# Patient Record
(demographics unavailable — no encounter records)

---

## 2018-11-05 NOTE — RAD
LEFT MIDDLE FINGER INTRAOPERATIVE FLUOROSCOPIC IMAGES:

 

COMPARISON:

Views of the left hand done earlier today, 

 

TECHNIQUE:  

Three intraoperative fluoroscopic images of the left middle finger are submitted for interpretation. 
 Fluoroscopic guidance was provided for Dr. Solomon.

 

FINDINGS/IMPRESSION:

Intraoperative fluoroscopic images demonstrate two K-wires transfixing the distal interphalangeal valentine
nt of the left middle finger.  There is suggestion of a tiny avulsion fracture at the medial aspect b
ase of the distal phalanx.  Correlation with intraoperative findings is recommended.

 

POS: [facility]

## 2018-11-05 NOTE — RAD
THREE VIEWS LEFT HAND:

 

COMPARISON: 

None.

 

HISTORY:  

Cut finger on a rope 1 hour prior 1 hour prior when attempting to catch a horse.

 

FINDINGS: 

There is a mallet deformity of the middle finger at the distal phalanx.  No fracture is seen.  There 
is a radiopaque foreign body adjacent to the index finger near the metacarpophalangeal joint.  

 

IMPRESSION: 

Mallet deformity of the middle finger may be acute or chronic.  This could represent a rupture of the
 common extensor tendon if acute.

 

POS: [facility]

## 2018-11-06 NOTE — OP
DATE OF PROCEDURE:  11/05/2018

 

PREOPERATIVE DIAGNOSES:

1.  Left middle finger open dislocation.

2.  Left middle finger open extensor tendon laceration terminal nearly partial avulsion.

3.  Left middle finger radial collateral ligament tear.

4.  Left middle finger digital nerve laceration, radial aspect 2 of 3 rami, terminal, and 3.0 cm woun
d.

 

POSTOPERATIVE DIAGNOSES:

1.  Left middle finger open dislocation.

2.  Left middle finger open extensor tendon laceration terminal nearly partial avulsion.

3.  Left middle finger radial collateral ligament tear.

4.  Left middle finger digital nerve laceration, radial aspect 2 of 3 rami, terminal, and 3.0 cm woun
d.  Intact circulation.

 

PROCEDURES PERFORMED:

1.  Wound debridement.

2.  Debridement of material associated open dislocation with techniques as follows for both the two d
ebridements.

A.  Excisional technique.

B.  Use of curette, tenotomy scissors, Crile hemostat, Blackfeet blade, Pulsavac.

3.  Mild contamination found in the caudal articular, moderate contamination found in the subcutaneou
s tissue, requiring more extensive debridement and the excisional technique.

4.  Microscopic radial digital nerve repair, 9-0 Nurolon suture.

5.  Open reduction and internal fixation of open middle finger distal interphalangeal joint dislocati
on.

6.  Open extensor tendon repair.

7.  Open radial collateral ligament repair.

8.  C-arm supervision including debridement of material associated open dislocation distal phalangeal
 joint.

 

TOURNIQUET TIME:  66 minutes.

 

BLOOD LOSS:  50 mL.

 

FINDINGS:  Moderate soft tissue contamination, negative chondral surface damage.

 

INDICATION:  The patient with a rope type rotational injury leaving him with open dislocation, dorsal
 wound more than palmar did extend on the radial aspect, approximately 6 mm almost to the midline, bu
t did not cross the midline or palmar aspect.  The patient had good circulation preop.

 

DESCRIPTION OF PROCEDURE:  After successful general LMA technique by [employer], the limb wa
s prepped and draped.  The patient has already been anesthetized in the emergency room, could not get
 a good digital sensory exam, so we did not add more medication.

 

The patient then had the limb prepped and draped.  Time out was done appropriately.  We exsanguinated
 the limb to 250 mmHg pressure.  His transverse incision was extended distally 5 mm approximately 1 c
m exposed, neurovascular bundle where we could see, the deepest portion of his rami was intact, but t
he 2 rami of the digital nerves were partially lacerated.

 

We then extended incision enough proximally, so we could expose the entire joint surface, the extenso
r tendon was involved partially, 2-3 mm of insertion still left at the nail bed junction and some inv
olving the nail germinal matrix base as well.  We then did debridement as listed above using _____  t
echnique to open the joint, finished debridement of subcutaneous tissue with most contamination was l
ocated and then we saw the radial collateral ligament laceration well.  We then brought C-arm to the 
field, there are open reduction after debridement of both the wound and the material associated open 
with the fracture, visualized the joint being reduced through the defect in the extensor mechanism as
 well as of the C-arm and performed two 0.35 K-wire fixation in the frontal and sagittal planes and t
he joint nearly anatomic.  No over compression was accomplished.  Now, the joint was stable.

 

Then, we did a repair of the extensor tendon back on its most radial side of the laceration involved 
a 2 to 3 mm area of tendon distally, so this was repaired using 4-0 Prolene with a figure-of-eight palacio
ture.  The last 5 mm involved near the nail bed, so it was repaired back to the nail bed with 4-0 chr
omic in a figure-of-eight pattern.  There were no other defects seen.  We then repaired with a 4-0 Pr
olene in interrupted figure-of-eight fashion of radial collateral ligaments.

 

Microscope brought to the field now.  We then brought the microscope on the field to finish the reduc
tion.  The C-arm was moved away from the field after wires were cut below the distal level of the epi
dermis.  With the microscope, we identified the digital nerve were showed 2 rami, one completely lace
rated, one partially evulsed, and one intact.  We then performed prepare of the 2 rami that had the i
njury with interrupted 9-0 nylon suture, 2 each for each rami.

 

We had already finished irrigation, before performed the neurovascular repairs, irrigating some sligh
tly, we finished debridements, C-arm was off the field and then we closed, repaired the laceration wh
ich was 3 cm using interrupted 5-0 nylon.  The patient then had the wires cut, hemostasis was excelle
nt, so we then placed an Adaptic _____ bacitracin underneath the eponychial fold, more bacitracin ove
r the incision area, put him in a dorsal block splint with the PIP and about 30 degrees of flexion wi
th the split coming 6-7 mm distal to the long finger.  He left the operating room without evidence of
 anesthetic or operative complication.